# Patient Record
Sex: MALE | Race: WHITE | NOT HISPANIC OR LATINO | Employment: UNEMPLOYED | URBAN - METROPOLITAN AREA
[De-identification: names, ages, dates, MRNs, and addresses within clinical notes are randomized per-mention and may not be internally consistent; named-entity substitution may affect disease eponyms.]

---

## 2020-07-13 ENCOUNTER — APPOINTMENT (OUTPATIENT)
Dept: CT IMAGING | Facility: HOSPITAL | Age: 70
End: 2020-07-13
Payer: COMMERCIAL

## 2020-07-13 ENCOUNTER — ANCILLARY PROCEDURE (OUTPATIENT)
Dept: RADIOLOGY | Facility: URGENT CARE | Age: 70
End: 2020-07-13
Payer: COMMERCIAL

## 2020-07-13 ENCOUNTER — HOSPITAL ENCOUNTER (EMERGENCY)
Facility: HOSPITAL | Age: 70
Discharge: LEFT BEFORE DISCHARGE | End: 2020-07-13
Attending: EMERGENCY MEDICINE
Payer: COMMERCIAL

## 2020-07-13 ENCOUNTER — OFFICE VISIT (OUTPATIENT)
Dept: URGENT CARE | Facility: URGENT CARE | Age: 70
End: 2020-07-13
Payer: COMMERCIAL

## 2020-07-13 VITALS
SYSTOLIC BLOOD PRESSURE: 175 MMHG | DIASTOLIC BLOOD PRESSURE: 91 MMHG | TEMPERATURE: 97.9 F | OXYGEN SATURATION: 97 % | HEART RATE: 80 BPM | RESPIRATION RATE: 18 BRPM

## 2020-07-13 VITALS
SYSTOLIC BLOOD PRESSURE: 145 MMHG | OXYGEN SATURATION: 95 % | HEART RATE: 83 BPM | RESPIRATION RATE: 18 BRPM | BODY MASS INDEX: 32.78 KG/M2 | HEIGHT: 66 IN | DIASTOLIC BLOOD PRESSURE: 73 MMHG | TEMPERATURE: 98 F | WEIGHT: 204 LBS

## 2020-07-13 DIAGNOSIS — I26.99 PULMONARY EMBOLISM (CMS/HCC): Primary | ICD-10-CM

## 2020-07-13 DIAGNOSIS — R06.09 DYSPNEA ON EXERTION: Primary | ICD-10-CM

## 2020-07-13 LAB
ANION GAP SERPL CALC-SCNC: 6 MMOL/L (ref 3–11)
BASOPHILS # BLD AUTO: 0.03 10*3/UL
BASOPHILS NFR BLD AUTO: 0 % (ref 0–2)
BUN SERPL-MCNC: 14 MG/DL (ref 7–25)
CALCIUM SERPL-MCNC: 9.2 MG/DL (ref 8.6–10.3)
CHLORIDE SERPL-SCNC: 103 MMOL/L (ref 98–107)
CO2 SERPL-SCNC: 26 MMOL/L (ref 21–32)
CREAT SERPL-MCNC: 1.18 MG/DL (ref 0.7–1.3)
EOSINOPHIL # BLD AUTO: 0.15 10*3/UL
EOSINOPHIL NFR BLD AUTO: 2 % (ref 0–3)
ERYTHROCYTE [DISTWIDTH] IN BLOOD BY AUTOMATED COUNT: 12.9 % (ref 11.5–15)
GFR SERPL CREATININE-BSD FRML MDRD: 63 ML/MIN/1.73M*2
GLUCOSE SERPL-MCNC: 103 MG/DL (ref 70–105)
HCT VFR BLD AUTO: 40.6 % (ref 38–50)
HGB BLD-MCNC: 14.6 G/DL (ref 13.2–17.2)
LYMPHOCYTES # BLD AUTO: 2.6 10*3/UL
LYMPHOCYTES NFR BLD AUTO: 29 % (ref 15–47)
MCH RBC QN AUTO: 33.8 PG (ref 29–34)
MCHC RBC AUTO-ENTMCNC: 36 G/DL (ref 32–36)
MCV RBC AUTO: 94 FL (ref 82–97)
MONOCYTES # BLD AUTO: 0.58 10*3/UL
MONOCYTES NFR BLD AUTO: 6 % (ref 5–13)
NEUTROPHILS # BLD AUTO: 5.72 10*3/UL
NEUTROPHILS NFR BLD AUTO: 63 % (ref 46–70)
PLATELET # BLD AUTO: 109 10*3/UL (ref 130–350)
PMV BLD AUTO: 7.9 FL (ref 6.9–10.8)
POTASSIUM SERPL-SCNC: 4.3 MMOL/L (ref 3.5–5.1)
RBC # BLD AUTO: 4.32 10*6/ΜL (ref 4.1–5.8)
SODIUM SERPL-SCNC: 135 MMOL/L (ref 135–145)
WBC # BLD AUTO: 9.1 10*3/UL (ref 3.7–9.6)

## 2020-07-13 PROCEDURE — G0463 HOSPITAL OUTPT CLINIC VISIT: HCPCS | Mod: PO | Performed by: NURSE PRACTITIONER

## 2020-07-13 PROCEDURE — 87635 SARS-COV-2 COVID-19 AMP PRB: CPT | Performed by: NURSE PRACTITIONER

## 2020-07-13 PROCEDURE — 93005 ELECTROCARDIOGRAM TRACING: CPT | Mod: 76

## 2020-07-13 PROCEDURE — 71046 X-RAY EXAM CHEST 2 VIEWS: CPT | Mod: PO

## 2020-07-13 PROCEDURE — 80048 BASIC METABOLIC PNL TOTAL CA: CPT | Performed by: EMERGENCY MEDICINE

## 2020-07-13 PROCEDURE — G1004 CDSM NDSC: HCPCS

## 2020-07-13 PROCEDURE — 99284 EMERGENCY DEPT VISIT MOD MDM: CPT | Performed by: EMERGENCY MEDICINE

## 2020-07-13 PROCEDURE — 2550000100 HC RX 255: Mod: JW | Performed by: EMERGENCY MEDICINE

## 2020-07-13 PROCEDURE — 85025 COMPLETE CBC W/AUTO DIFF WBC: CPT | Mod: PO | Performed by: NURSE PRACTITIONER

## 2020-07-13 PROCEDURE — 36415 COLL VENOUS BLD VENIPUNCTURE: CPT | Mod: PO | Performed by: NURSE PRACTITIONER

## 2020-07-13 PROCEDURE — 93005 ELECTROCARDIOGRAM TRACING: CPT | Mod: PO | Performed by: NURSE PRACTITIONER

## 2020-07-13 PROCEDURE — C9803 HOPD COVID-19 SPEC COLLECT: HCPCS | Mod: PO | Performed by: NURSE PRACTITIONER

## 2020-07-13 PROCEDURE — 99203 OFFICE O/P NEW LOW 30 MIN: CPT | Mod: 25 | Performed by: NURSE PRACTITIONER

## 2020-07-13 RX ORDER — IOPAMIDOL 755 MG/ML
80 INJECTION, SOLUTION INTRAVASCULAR ONCE
Status: COMPLETED | OUTPATIENT
Start: 2020-07-13 | End: 2020-07-13

## 2020-07-13 RX ORDER — ABACAVIR SULFATE, DOLUTEGRAVIR SODIUM, LAMIVUDINE 600; 50; 300 MG/1; MG/1; MG/1
1 TABLET, FILM COATED ORAL
COMMUNITY
Start: 2020-04-22 | End: 2021-04-22

## 2020-07-13 RX ADMIN — IOPAMIDOL 80 ML: 755 INJECTION, SOLUTION INTRAVENOUS at 19:35

## 2020-07-13 RX ADMIN — Medication 1 PACKAGE: at 20:10

## 2020-07-13 ASSESSMENT — ENCOUNTER SYMPTOMS
DIARRHEA: 0
PALPITATIONS: 0
ABDOMINAL PAIN: 0
EYE DISCHARGE: 0
FATIGUE: 0
CHILLS: 0
VOMITING: 0
EYE REDNESS: 0
SHORTNESS OF BREATH: 1
SORE THROAT: 0
RHINORRHEA: 0
FEVER: 0
HEADACHES: 0
EYE ITCHING: 0
NAUSEA: 0
WHEEZING: 1
TROUBLE SWALLOWING: 0
COUGH: 1
SINUS PRESSURE: 1

## 2020-07-13 ASSESSMENT — PAIN SCALES - GENERAL: PAINLEVEL: 1

## 2020-07-13 NOTE — PROGRESS NOTES
Subjective      Zan Bagley is a 69 y.o. male.    He presents to the clinic to be evaluated for chest congestion, cough (mixed dry/productive), dyspnea with exertion, pain with coughing, dizzy, and fatigue. Symptoms started 4 days ago with some improvement. He reports he is here because his kids encouraged him to be evaluated. He reports midsternum chest pain last Thursday/Friday. Denies active chest pain. He does report exertional chest pain. Denies fever, chills, body aches. Denies self treatment. Denies history of asthma, COPD, hypertension, MI, or hyperlipidemia. He is a non-smoker. Denies known exposure to COVID-19. He does report several years ago being told he had blood clots in his legs and was placed on anticoagulant therapy, which he no longer takes.      The following have been reviewed and updated as appropriate in this visit:    No Known Allergies  Current Outpatient Medications   Medication Sig Dispense Refill   • abacavir-dolutegravir-lamivud (Triumeq) 600- mg tablet Take 1 tablet by mouth       No current facility-administered medications for this visit.      Past Medical History:   Diagnosis Date   • HIV (human immunodeficiency virus infection) (CMS/[facility]) ([facility])      Past Surgical History:   Procedure Laterality Date   • NO PAST SURGERIES       Family History   Problem Relation Age of Onset   • Rheum arthritis Mother    • Heart disease Sister    • Diabetes Sister    • Heart disease Brother    • Diabetes Brother    • Cancer Brother         prostate, skin     Social History     Occupational History   • Not on file   Tobacco Use   • Smoking status: Never Smoker   • Smokeless tobacco: Never Used   Substance and Sexual Activity   • Alcohol use: Not on file   • Drug use: Not on file   • Sexual activity: Not on file   Social History Narrative   • Not on file       Review of Systems   Constitutional: Negative for chills, fatigue and fever.   HENT: Positive for sinus pressure. Negative for congestion,  "ear discharge, ear pain, mouth sores, postnasal drip, rhinorrhea, sneezing, sore throat and trouble swallowing.    Eyes: Negative for discharge, redness and itching.   Respiratory: Positive for cough, shortness of breath and wheezing.    Cardiovascular: Positive for chest pain. Negative for palpitations and leg swelling.   Gastrointestinal: Negative for abdominal pain, diarrhea, nausea and vomiting.   Skin: Negative for rash.   Neurological: Negative for headaches.       Objective   /73 (BP Location: Right arm, Patient Position: Sitting, Cuff Size: Regular Adult)   Pulse 83   Temp 36.7 °C (98 °F) (Temporal)   Resp 18   Ht 1.676 m (5' 6\")   Wt 92.5 kg (204 lb)   SpO2 95%   BMI 32.93 kg/m²     Physical Exam  Constitutional:       General: He is not in acute distress.     Appearance: He is well-developed.   HENT:      Head: Normocephalic and atraumatic.      Right Ear: Tympanic membrane and ear canal normal.      Left Ear: Tympanic membrane and ear canal normal.      Nose: Mucosal edema and rhinorrhea present.      Right Sinus: No maxillary sinus tenderness or frontal sinus tenderness.      Left Sinus: No maxillary sinus tenderness or frontal sinus tenderness.      Mouth/Throat:      Mouth: No oral lesions.      Pharynx: Uvula midline. No oropharyngeal exudate or posterior oropharyngeal erythema.   Eyes:      Conjunctiva/sclera: Conjunctivae normal.   Neck:      Musculoskeletal: Normal range of motion and neck supple.   Cardiovascular:      Rate and Rhythm: Normal rate and regular rhythm.      Heart sounds: Normal heart sounds. No murmur.   Pulmonary:      Effort: Pulmonary effort is normal. No respiratory distress.      Breath sounds: Examination of the right-upper field reveals decreased breath sounds. Examination of the left-upper field reveals decreased breath sounds. Decreased breath sounds present. No wheezing, rhonchi or rales.      Comments: Able to speak full sentences without shortness of " breath.  Abdominal:      General: Bowel sounds are normal.      Palpations: Abdomen is soft. There is no splenomegaly.      Tenderness: There is no abdominal tenderness.   Lymphadenopathy:      Cervical: No cervical adenopathy.   Skin:     General: Skin is warm and dry.      Capillary Refill: Capillary refill takes less than 2 seconds.      Findings: No rash.   Neurological:      Mental Status: He is alert and oriented to person, place, and time.     DIAGNOSTICS  Results for orders placed or performed in visit on 07/13/20   CBC w/auto differential Blood, Venous   Result Value Ref Range    WBC 9.1 3.7 - 9.6 10*3/uL    RBC 4.32 4.10 - 5.80 10*6/µL    Hemoglobin 14.6 13.2 - 17.2 g/dL    Hematocrit 40.6 38.0 - 50.0 %    MCV 94.0 82.0 - 97.0 fL    MCH 33.8 29.0 - 34.0 pg    MCHC 36.0 32.0 - 36.0 g/dL    RDW 12.9 11.5 - 15.0 %    Platelets 109 (L) 130 - 350 10*3/uL    MPV 7.9 6.9 - 10.8 fL    Neutrophils% 63 46 - 70 %    Lymphocytes% 29 15 - 47 %    Monocytes% 6 5 - 13 %    Eosinophils% 2 0 - 3 %    Basophils% 0 0 - 2 %    Neutrophils Absolute 5.72 10*3/uL    Lymphocytes Absolute 2.60 10*3/uL    Monocytes Absolute 0.58 10*3/uL    Eosinophils Absolute 0.15 10*3/uL    Basophils Absolute 0.03 10*3/uL     X-ray chest 2 views  Narrative: Exam:   Chest x-ray, 2 views 07/13/2020    Clinical History:  Dyspnea on exertion; dyspnea with exertion    Comparison(s):  None available    Findings:  Increase interstitial markings noted at the lung bases bilaterally with hyperinflation of the lungs. No discrete focal consolidation. Heart size and vascular markings normal. No pneumothorax or effusion.  Impression: IMPRESSION:  1.  Probable COPD changes.  2.  Increased interstitial markings at the bases of uncertain acuity. Possibilities include pneumonia, edema or chronic fibrotic change.    ASSESSMENT AND PLAN    Diagnosis Plan   1. Dyspnea on exertion  ECG 12 lead    CBC w/auto differential Blood, Venous    X-ray chest 2 views    COVID-19  PCR     1. Dyspnea on exertion. Symptom onset 4 days ago. He is in no acute distress and physical exam is overall unremarkable. EKG reveals NSR. CBC is unremarkable. COVID-19 PCR is pending. Chest x-ray shows probable COPD changes with increased interstitial markings at the bases of uncertain acuity. After reviewing old records in [facility] everywhere, I note Zan has history of extensive PE's in 9/2018. Recommend further workup in the ER to rule out PE. Discussed case with on-call urgent care physician, Dr. Cabral, whom is agreeable with ER referral. I notified  Transfer Center. Zan will transfer via POV. Patient is agreeable to plan of care.     Yany Sethi, CNP

## 2020-07-14 LAB — SARS-COV-2 RNA RESP QL NAA+PROBE: NEGATIVE

## 2020-07-14 NOTE — DISCHARGE INSTRUCTIONS
You need to follow-up with your primary care provider in [place] within the next 1 to 2 weeks.    Today in the emergency department you are found to have bilateral pulmonary emboli.  I did recommend you stay in the hospital for initial care and management.  You agreed to sign out AGAINST MEDICAL ADVICE.  Take Eliquis 2 times daily for 7 days and then 5 mg 2 times daily thereafter.    Take prescribed HIV medication as directed.    Return to the emergency department if you are having worse shortness of breath or chest pain or weakness or any complaint    Your Covid-19 test is still pending.  You should isolate yourself from others until we know the results of this test.  You need to wear mask in public.  It is possible p you could have Covid and if so, you need to stay home and isolate yourself from public and family until you are 7 days from the onset of your symptoms AND 3 days with no fever (and not requiring any medications).  Drink lots of fluids.  Take Tylenol 1000 mg every 6 hours as needed for pain or fever.  Please change position every hour to improve breathing.  We have found that lying on your right side and alternating with back or prone or left side helps.  All Covid-19 Nurse Call line ([phone]) with any questions or concerns.

## 2020-07-14 NOTE — ED PROVIDER NOTES
HPI:  Chief Complaint   Patient presents with   • Shortness of Breath     Pt arrives to ED with complaints of shortness of breath for the past 4 days. Pt reports he was seen at urgent care today and referred to ED.      HPI  69-year-old male sent to the emergency department from urgent care with anterior chest aching associated with cough and shortness of breath and dyspnea on exertion for the last 4 days.  He has history of HIV.  He states he is been noncompliant with meds but is back on them now.  He has history of pulmonary embolism and while at urgent care there was some changes on his chest x-ray and provider was concerned about possible PE and therefore sent to the emergency department.  He has not been hypoxic.  He denies fever.    HISTORY:  Past Medical History:   Diagnosis Date   • HIV (human immunodeficiency virus infection) (CMS/HCC) ([facility])        Past Surgical History:   Procedure Laterality Date   • NO PAST SURGERIES         Family History   Problem Relation Age of Onset   • Rheum arthritis Mother    • Heart disease Sister    • Diabetes Sister    • Heart disease Brother    • Diabetes Brother    • Cancer Brother         prostate, skin       Social History     Tobacco Use   • Smoking status: Never Smoker   • Smokeless tobacco: Never Used   Substance Use Topics   • Alcohol use: Not on file   • Drug use: Not on file       ROS:  Constitutional: negative for fever  Eyes: negative for eye pain  ENT: negative for sore throat  Cardiovascular: chest pain  Respiratory: Dyspnea and cough  GI: negative for abdominal pain  : negative for hematuria  Musculoskeletal: negative for back pain  Neuro: negative for headache  Hematology: negative for bleeding  Skin: negative for rash    PHYSICAL EXAM:  ED Triage Vitals [07/13/20 1722]   Temp Heart Rate Resp BP SpO2   36.6 °C (97.9 °F) 72 18 145/92 93 %      Temp Source Heart Rate Source Patient Position BP Location FiO2 (%)   Oral -- -- -- --     Nursing note and vitals  reviewed.  Constitutional: appears well-developed.   Head: Normocephalic and atraumatic.   Eyes: Conjunctiva normal.  Neck: Supple  Cardiovascular: Regular rate and rhythm  Pulmonary/Chest: No respiratory distress.  Clear to auscultation bilaterally.  Chest wall is mildly tender to palpation  Abdominal: Soft and nontender.  Normal bowel sounds.  Back: Non-tender.  Musculoskeletal: No edema  Neurological: Alert. No focal deficits.  Skin: Skin is warm and dry. No rash noted.   Psychiatric: Normal mood and affect.    MDM: Patient has shallow dry cough and describes an anterior chest discomfort with some dyspnea that is gradually increased over last couple and I am most concerned he has Covid.  Does have history of PE and was sent here from urgent care for rule out of PE and therefore CT is obtained.  Chemistry panel is unremarkable.  CT angios of chest does show bilateral pulmonary emboli.  I recommend patient be hospitalized but he is refusing hospitalization.  He signed out AMA.  Covid-19 test is pending at the time of him leaving the emergency department.  He is given Eliquis starter pack.  His primary care provider is in [place] and he is advised he needs to get there to follow with primary care closely with these pulmonary emboli.  He is advised to return to the emergency department with any increase shortness of breath or bleeding or pain or any concern.    Labs Reviewed   BASIC METABOLIC PANEL - Normal       Result Value    Sodium 135      Potassium 4.3      Chloride 103      CO2 26      BUN 14      Creatinine 1.18      Glucose 103      Calcium 9.2      Anion Gap 6      eGFR 63      Narrative:     Estimated GFR calculated using the 2009 CKD-EPI creatinine equation.       CT angiogram chest PE with IV contrast   Final Result   IMPRESSION:   1.  Positive for acute pulmonary artery emboli of the bilateral distal left and right main pulmonary arteries with emboli extending into the bilateral segmental and  subsegmental pulmonary arteries. Slight straightening of the interventricular septum, can be seen with right heart strain.   2.  Minimal patchy groundglass opacity of the lingula, is indeterminate but may be infectious in etiology. Posterior bibasilar atelectasis/scarring. The lungs are otherwise clear.   3.  Mildly enlarged main pulmonary artery, can be seen in pulmonary artery hypertension.          ED Medication Administration from 07/13/2020 1717 to 07/13/2020 2023       Date/Time Order Dose Route Action Action by     07/13/2020 1935 iopamidoL (ISOVUE-370) 76 % injection 80 mL 80 mL intravenous Given ELAINE Peters     07/13/2020 2010 apixaban (ELIQUIS) 5 mg Disp: #74 tablet - ED DOSE PACK 1 Package 1 Package oral ED take home pack KIRA Newman          PROCEDURES:  ECG 12 lead, Shortness of breath    Date/Time: 7/13/2020 6:29 PM  Performed by: Pascual Pabon MD  Authorized by: Pascual Pabon MD     Comments:      Normal sinus rhythm, rate 72, borderline QT prolongation.  Flattened T waves inferiorly.  No old EKG other than the EKG from earlier today at urgent care which is unchanged from now.        ED COURSE:  Ultimately patient's Covid-19 test returned negative.    CLINICAL IMPRESSION:  Final diagnoses:   [I26.99] Pulmonary embolism (CMS/HCC) (HCC)   HIV  Dyspnea       A voice recognition program was used to aid in documentation of this record.  Sometimes words are not printed exactly as they were spoken.  While efforts were made to carefully edit and correct any inaccuracies, some areas may be present; please take these into context.  Please contact the provider if areas are identified.       Pascual Pabon MD  07/15/20 [id number]

## 2020-07-17 NOTE — INTERDISCIPLINARY/THERAPY
ED Case Manager [phone]    Chart review completed after patient left AMA after being seen by provider in ED department.    Called patient to see if he has any medical concerns or still having symptoms?     Patient lives in [place] and is seeking medical care there.     Educated patient on s/s for when to seek medical attention, patient verbalizing understanding.     Eli Dey RN, CM

## 2021-02-03 ENCOUNTER — NEW PATIENT (OUTPATIENT)
Dept: URBAN - METROPOLITAN AREA CLINIC 26 | Facility: CLINIC | Age: 71
End: 2021-02-03
Payer: COMMERCIAL

## 2021-02-03 PROCEDURE — S0620 ROUTINE OPHTHALMOLOGICAL EXA: HCPCS | Performed by: OPTOMETRIST

## 2021-02-03 ASSESSMENT — INTRAOCULAR PRESSURE
OD: 15
OS: 14

## 2021-02-03 ASSESSMENT — KERATOMETRY
OS: 43.25
OD: 43.38

## 2021-02-03 ASSESSMENT — VISUAL ACUITY
OS: 20/20
OD: 20/20

## 2022-02-03 ENCOUNTER — OFFICE VISIT (OUTPATIENT)
Dept: URBAN - METROPOLITAN AREA CLINIC 26 | Facility: CLINIC | Age: 72
End: 2022-02-03
Payer: MEDICARE

## 2022-02-03 DIAGNOSIS — H43.391 OTHER VITREOUS OPACITIES, RIGHT EYE: ICD-10-CM

## 2022-02-03 DIAGNOSIS — H52.4 PRESBYOPIA: ICD-10-CM

## 2022-02-03 DIAGNOSIS — H25.813 COMBINED FORMS OF AGE-RELATED CATARACT, BILATERAL: Primary | ICD-10-CM

## 2022-02-03 PROCEDURE — 92134 CPTRZ OPH DX IMG PST SGM RTA: CPT | Performed by: OPTOMETRIST

## 2022-02-03 PROCEDURE — 92014 COMPRE OPH EXAM EST PT 1/>: CPT | Performed by: OPTOMETRIST

## 2022-02-03 ASSESSMENT — KERATOMETRY
OD: 43.25
OS: 43.63

## 2022-02-03 ASSESSMENT — INTRAOCULAR PRESSURE
OD: 16
OS: 17

## 2022-02-03 ASSESSMENT — VISUAL ACUITY
OD: 20/20
OS: 20/25

## 2023-02-03 ENCOUNTER — OFFICE VISIT (OUTPATIENT)
Dept: URBAN - METROPOLITAN AREA CLINIC 26 | Facility: CLINIC | Age: 73
End: 2023-02-03
Payer: COMMERCIAL

## 2023-02-03 DIAGNOSIS — H43.391 OTHER VITREOUS OPACITIES, RIGHT EYE: ICD-10-CM

## 2023-02-03 DIAGNOSIS — H52.4 PRESBYOPIA: Primary | ICD-10-CM

## 2023-02-03 DIAGNOSIS — H25.813 COMBINED FORMS OF AGE-RELATED CATARACT, BILATERAL: ICD-10-CM

## 2023-02-03 PROCEDURE — 92014 COMPRE OPH EXAM EST PT 1/>: CPT | Performed by: OPTOMETRIST

## 2023-02-03 ASSESSMENT — INTRAOCULAR PRESSURE
OS: 18
OD: 17

## 2023-02-03 ASSESSMENT — VISUAL ACUITY
OS: 20/25
OD: 20/25

## 2023-02-03 ASSESSMENT — KERATOMETRY
OD: 43.50
OS: 43.25